# Patient Record
Sex: MALE | Race: WHITE
[De-identification: names, ages, dates, MRNs, and addresses within clinical notes are randomized per-mention and may not be internally consistent; named-entity substitution may affect disease eponyms.]

---

## 2018-05-18 ENCOUNTER — HOSPITAL ENCOUNTER (EMERGENCY)
Dept: HOSPITAL 25 - UCEAST | Age: 68
Discharge: HOME | End: 2018-05-18
Payer: COMMERCIAL

## 2018-05-18 VITALS — SYSTOLIC BLOOD PRESSURE: 140 MMHG | DIASTOLIC BLOOD PRESSURE: 78 MMHG
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
## 2018-05-18 DIAGNOSIS — Z79.84: ICD-10-CM

## 2018-05-18 DIAGNOSIS — S92.421A: Primary | ICD-10-CM
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
## 2018-05-18 DIAGNOSIS — I10: ICD-10-CM
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
## 2018-05-18 DIAGNOSIS — E11.9: ICD-10-CM

## 2018-05-18 DIAGNOSIS — K21.9: ICD-10-CM

## 2018-05-18 DIAGNOSIS — E78.5: ICD-10-CM

## 2018-05-18 PROCEDURE — 90715 TDAP VACCINE 7 YRS/> IM: CPT

## 2018-05-18 PROCEDURE — 99203 OFFICE O/P NEW LOW 30 MIN: CPT

## 2018-05-18 PROCEDURE — 90471 IMMUNIZATION ADMIN: CPT

## 2018-05-18 PROCEDURE — G0463 HOSPITAL OUTPT CLINIC VISIT: HCPCS

## 2018-05-18 PROCEDURE — 96365 THER/PROPH/DIAG IV INF INIT: CPT

## 2018-05-18 NOTE — RAD
INDICATION: Right great toe pain. Crush injury     



COMPARISON: None



TECHNIQUE: AP, lateral, and oblique views were obtained.



FINDINGS: There is a comminuted fracture of the distal phalanx of the great toe consistent

with a crush type injury. Multiple displaced fracture fragments. There is associated soft

tissue injury.



IMPRESSION: COMMINUTED FRACTURES OF THE DISTAL PHALANX WITH ASSOCIATED SOFT TISSUE INJURY

## 2018-05-18 NOTE — UC
Lower Extremity/Ankle HPI





- HPI Summary


HPI Summary: 





66 yo male presents with injury to RIGHT great toe. He tells me that this 

morning, about 4-5 hours PTA, he was carrying a metal slab when he dropped it 

and it landed on his RIGHT great toe. Had immediate pain, but was able to keep 

ambulating and working. His wife bandaged the area as it was bleeding a lot. He 

put a sock on and went back to doing yard work for a few hours. Noticed his 

sock was soaked with blood - his wife drove him to . He is diabetic. Denies 

fever, chills, numbness, or tingling. Pain is well controlled. Thinks last 

tetanus was over 5 years ago





- History of Current Complaint


Chief Complaint: UCLowerExtremity


Stated Complaint: TOE INJURY


Time Seen by Provider: 05/18/18 14:00


Hx Obtained From: Patient


Severity Initially: Moderate


Severity Currently: Moderate


Pain Intensity: 5


Pain Scale Used: 0-10 Numeric


Aggravating Factor(s): Standing, Ambulation


Alleviating Factor(s): Rest, Elevation


Able to Bear Weight: Yes





- Allergies/Home Medications


Allergies/Adverse Reactions: 


 Allergies











Allergy/AdvReac Type Severity Reaction Status Date / Time


 


No Known Allergies Allergy   Verified 05/18/18 13:48











Home Medications: 


 Home Medications





Dulaglutide [Trulicity] 0.75 mg SQ DAILY 05/18/18 [History Confirmed 05/18/18]


Lisinopril [Lisinopril 2.5 MG-] 2.5 mg PO DAILY WITH MEAL 05/18/18 [History 

Confirmed 05/18/18]


Omeprazole CAP* [Prilosec CAP* 20 MG] 20 mg PO DAILY 05/18/18 [History 

Confirmed 05/18/18]


Pioglitazone HCl [Actos] 15 mg PO DAILY WITH MEAL 05/18/18 [History Confirmed 05 /18/18]


Simvastatin [Zocor] 40 mg PO DAILY WITH MEAL 05/18/18 [History Confirmed 05/18/ 18]


metFORMIN* [Glucophage 1000 MG TAB *] 1,000 mg PO BID 05/18/18 [History 

Confirmed 05/18/18]











PMH/Surg Hx/FS Hx/Imm Hx


Endocrine History: Diabetes, Dyslipidemia


Cardiovascular History: Hypertension


GI/ History: Gastroesophageal Reflux





- Surgical History


Surgical History: Yes


Surgery Procedure, Year, and Place: hernia repair- 20 yrs ago





- Family History


Known Family History: Positive: Diabetes





- Social History


Occupation: Retired


Lives: With Family


Alcohol Use: Rare


Substance Use Type: None


Smoking Status (MU): Never Smoked Tobacco





Review of Systems


Constitutional: Negative


Skin: Other - Open wound to RIGHT great toe


Respiratory: Negative


Neurovascular: Negative


Musculoskeletal: Decreased ROM - RIGHT great toe


Neurological: Negative


Psychological: Negative


All Other Systems Reviewed And Are Negative: Yes





Physical Exam





- Summary


Physical Exam Summary: 





GENERAL: NAD. WDWN. No pain distress.


SKIN: Left great toe: Distal open wound with dorsally displaced nail. 

Superficial laceration across IP joint. 


NECK: Supple. Nontender. No lymphadenopathy. 


CHEST:  No accessory muscle use. Breathing comfortably and in no distress.


CV:  RRR. Without m/r/g. Pulses intact PT and DP. Brisk cap refill.


MSK: Right great toe: Distal with open wound. Able to flex and extend at IP but 

with pain. 


NEURO: Alert. Sensations intact and symmetric B/L LEs


PSYCH: Age appropriate behavior.


Triage Information Reviewed: Yes


Vital Signs: 


 Initial Vital Signs











Temp  98.6 F   05/18/18 13:43


 


Pulse  63   05/18/18 13:43


 


Resp  18   05/18/18 13:43


 


BP  140/78   05/18/18 13:43


 


Pulse Ox  98   05/18/18 13:43














Lower Extremity Course/Dx





- Course


Course Of Treatment: XR: IMPRESSION: COMMINUTED FRACTURES OF THE DISTAL PHALANX 

WITH ASSOCIATED SOFT TISSUE INJURY.  Call to Dr. Patterson (on call Ortho) at 1510

, no answer and left message on provided cell phone - no call back as of 1550. 

Started pt on IV ancef 1gm. Called Ortho office again 1550 on hold 15 minutes. 

Called Ortho again 1615 and spoke to Dr. Glaser who advised leanneef, dc with po 

anbx, xerform, and bulky dressing - f/u with Dr. Monson on Monday. tdap 

updated today





- Differential Dx/Diagnosis


Provider Diagnoses: Displaced COMMINUTED FRACTURES OF THE DISTAL PHALANX right 

great toe





Discharge





- Sign-Out/Discharge


Documenting (check all that apply): Discharge/Admit/Transfer





- Discharge Plan


Condition: Stable


Disposition: HOME


Prescriptions: 


Amoxicillin/Clavulanate TAB* [Augmentin *] 875 mg PO BID #14 tab


Patient Education Materials:  Toe Fracture (ED)


Referrals: 


No Primary Care Phys,NOPCP [Primary Care Provider] - 


Javier Abreu MD [Medical Doctor] - 05/21/18


Additional Instructions: 


If you develop a fever, shortness of breath, chest pain, new or worsening 

symptoms - please call your PCP or go to the ED.


 





Your blood pressure was high at todays visit. Please see your primary provider 

within 4 weeks for recheck and re-evaluation.








1) Please call Orthopedics at the number below to schedule a follow up 

appointment for MONDAY


2) KEEP THE TOE CLEAN, DRY, AND WITH A BANDAGE AT ALL TIMES


3) Take your antibiotic 


4) Please call with any questions





- Billing Disposition and Condition


Condition: STABLE


Disposition: HOME

## 2018-05-22 ENCOUNTER — HOSPITAL ENCOUNTER (OUTPATIENT)
Dept: HOSPITAL 25 - OR | Age: 68
Discharge: HOME | End: 2018-05-22
Attending: ORTHOPAEDIC SURGERY
Payer: COMMERCIAL

## 2018-05-22 VITALS — DIASTOLIC BLOOD PRESSURE: 80 MMHG | SYSTOLIC BLOOD PRESSURE: 139 MMHG

## 2018-05-22 DIAGNOSIS — I10: ICD-10-CM

## 2018-05-22 DIAGNOSIS — E11.9: ICD-10-CM

## 2018-05-22 DIAGNOSIS — G47.33: ICD-10-CM

## 2018-05-22 DIAGNOSIS — E78.5: ICD-10-CM

## 2018-05-22 DIAGNOSIS — S92.421B: Primary | ICD-10-CM

## 2018-05-22 DIAGNOSIS — Z79.84: ICD-10-CM

## 2018-05-22 DIAGNOSIS — J45.909: ICD-10-CM
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
## 2018-05-22 PROCEDURE — 76000 FLUOROSCOPY <1 HR PHYS/QHP: CPT

## 2018-05-22 PROCEDURE — C1776 JOINT DEVICE (IMPLANTABLE): HCPCS

## 2018-05-22 NOTE — OP
Operative Report - Blank





- Operative Report


Date of Operation: 05/22/18


Note: 


PATIENT: Claus Klein





YOB: 1950





DATE OF SURGERY: 5/22/2018





SURGEON: Javier Abreu MD





ASSISTANT: ROBERT Beaver, whos assistance was necessary for positioning, 

retraction, help with instrumentation, and closure.





ANESTHESIOLOGIST: Dr. Parisi





PREOPERATIVE DIAGNOSIS: Right great toe distal phalanx open fracture with nail 

bed injury





POSTOPERATIVE DIAGNOSIS: Right great toe distal phalanx open fracture with nail 

bed injury





OPERATION:


1. Open reduction and fixation of right great toe distal phalanx fracture


2. Irrigation and debridement of right great toe skin, subcutaneous tissue, 

fascia, bone


3. Right great toe nail bed repair





ANESTHESIA: MAC + local anesthesia





IMPLANTS: 0.062 k-wire





TOURNIQUET TIME: Less than one hour with an ankle Esmarch tourniquet





SPECIMENS: None





ESTIMATED BLOOD LOSS: Minimal





COMPLICATIONS: none





STATUS: Stable from the operating room to the recovery room and then home





INDICATIONS FOR PROCEDURE:


Claus sustained a crushing injury to his right great toe on 5/18/18.  This 

resulted in an open distal phalanx fracture and a nail bed injury.  Both 

operative and non-operative treatment alternatives were reviewed. Further, the 

nature and risks of surgery were reviewed in careful detail, in the office as 

well as the pre-operative holding area. Our discussions regarding the risks of 

surgery included, but were not limited to, infection, wound problems, nerve 

injury, neuroma, RSD, persistent symptoms, blood clot, nonunion, malunion, deep 

infection, loss of the nail, deformed nail, failure of the surgery, and even 

the remote chance of catastrophic complication, including loss of limb.





DESCRIPTION OF PROCEDURE:


The patient was seen in the preoperative holding unit and informed written 

consent was obtained.  The appropriate extremity was marked. The patient was 

then brought to the operating room and carefully positioned on the operating 

room table. Anesthesia was induced. All bony prominences were padded with great 

care. A chlorhexidine based pre-scrub was performed followed by a Betadine prep 

and drape in standard sterile fashion. A surgical safety pause was then 

conducted in which we confirmed the appropriate patient, extremity, planned 

procedure, availability of equipment, indication and administration of 

prophylactic antibiotics, and DVT prophylaxis in the form of a compression boot 

on the non-surgical extremity. 





I began with Esmarch exsanguination of the limb and placement of the ankle 

Esmarch tourniquet.  A great toe digital block was performed with 10 cc of half 

percent plain Marcaine.  The nail was then explored and was largely avulsed.  

There was a significant injury to the eponychium as well.  The nail was then 

removed from the matrix and soaked in Betadine.  There was a large wound across 

the entirety of the width of the matrix (about 3cm in length).  There was 

exposed bone.  Nonviable tissue at the skin, subcutaneous, fascial, and 

periosteal/bone layer was then sharply debrided and excised with a 15 blade 

scalpel.  I then copiously irrigated the wound, both superficially as well as 

down to the level of the bone and fracture.  Fracture hematoma was curetted 

away.  The wound was copiously irrigated. I then manually reduced the fracture 

fragments as best as possible, although the fracture was highly comminuted.  I 

then placed a 0.062 K wire retrograde through the distal phalanx, across the IP 

joint into the proximal phalanx to fixate the fractures and take the pressure 

off the overlying nail bed.  Fluoroscopy was used to confirm adequate reduction 

of the fractures, as well as placement of the K wire.  I then turned my 

attention to the nail bed wound.  5-0 chromic suture was utilized to repair the 

nail bed wound.  This led to a nice repair.  I then washed off the nail and 

placed it under the remaining eponychial fold.  I used Dermabond to secure the 

nail underneath the eponychial fold.  At this point all remaining abrasions and 

superficial wounds were copiously irrigated.  A sterile dressing was then 

applied.


  


The patient was then awakened from anesthesia and transferred to the recovery 

room in stable condition.  There were no complications.  All needle and sponge 

counts were correct at the end of the case.





ATTESTATION: I attest I was present and scrubbed and performed the critical 

portions of the procedure myself.








POSTOPERATIVE PLAN: Claus will remain heel weightbearing.  He will continue 

on antibiotics.  He will take an aspirin daily for DVT prophylaxis.  He will 

follow-up in one week for a wound check.  He will call sooner if any issues 

arise before then.

## 2018-05-24 NOTE — RAD
INDICATION: Crush injury right great toe



COMPARISONS: May 18, 2018



TECHNIQUE: Fluoroscopy was provided for a surgical procedure. Total fluoroscopy time is:

26 seconds



FINDINGS: Spot images demonstrate percutaneous fixation of the first digit.



IMPRESSION: FLUOROSCOPY WAS PROVIDED FOR A SURGICAL PROCEDURE



CPT II Codes: